# Patient Record
Sex: FEMALE | Race: WHITE
[De-identification: names, ages, dates, MRNs, and addresses within clinical notes are randomized per-mention and may not be internally consistent; named-entity substitution may affect disease eponyms.]

---

## 2017-04-17 ENCOUNTER — HOSPITAL ENCOUNTER (EMERGENCY)
Dept: HOSPITAL 74 - JER | Age: 22
Discharge: HOME | End: 2017-04-17
Payer: COMMERCIAL

## 2017-04-17 VITALS — BODY MASS INDEX: 41.5 KG/M2

## 2017-04-17 VITALS — TEMPERATURE: 98.4 F

## 2017-04-17 VITALS — DIASTOLIC BLOOD PRESSURE: 68 MMHG | SYSTOLIC BLOOD PRESSURE: 120 MMHG | HEART RATE: 80 BPM

## 2017-04-17 DIAGNOSIS — K52.9: Primary | ICD-10-CM

## 2017-04-17 LAB
ALBUMIN SERPL-MCNC: 3.9 G/DL (ref 3.4–5)
ALP SERPL-CCNC: 116 U/L (ref 45–117)
ALT SERPL-CCNC: 33 U/L (ref 12–78)
ANION GAP SERPL CALC-SCNC: 7 MMOL/L (ref 8–16)
ANISOCYTOSIS BLD QL SMEAR: (no result)
APPEARANCE UR: CLEAR
AST SERPL-CCNC: 22 U/L (ref 15–37)
BASOPHILS # BLD: 0.2 % (ref 0–2)
BILIRUB SERPL-MCNC: 0.8 MG/DL (ref 0.2–1)
BILIRUB UR STRIP.AUTO-MCNC: NEGATIVE MG/DL
CALCIUM SERPL-MCNC: 8.9 MG/DL (ref 8.5–10.1)
CO2 SERPL-SCNC: 28 MMOL/L (ref 21–32)
COLOR UR: YELLOW
CREAT SERPL-MCNC: 0.6 MG/DL (ref 0.55–1.02)
DEPRECATED RDW RBC AUTO: 20.1 % (ref 11.6–15.6)
EOSINOPHIL # BLD: 1.4 % (ref 0–4.5)
GLUCOSE SERPL-MCNC: 100 MG/DL (ref 74–106)
HYPOCHROMIA BLD QL SMEAR: (no result)
KETONES UR QL STRIP: (no result)
LEUKOCYTE ESTERASE UR QL STRIP.AUTO: NEGATIVE
MCH RBC QN AUTO: 22.8 PG (ref 25.7–33.7)
MCHC RBC AUTO-ENTMCNC: 32.1 G/DL (ref 32–36)
MCV RBC: 71 FL (ref 80–96)
MICROCYTES BLD QL SMEAR: (no result)
NEUTROPHILS # BLD: 79 % (ref 42.8–82.8)
NITRITE UR QL STRIP: NEGATIVE
PH UR: 8 [PH] (ref 5–8)
PLATELET # BLD AUTO: 255 K/MM3 (ref 134–434)
PMV BLD: 8.1 FL (ref 7.5–11.1)
PROT SERPL-MCNC: 7.3 G/DL (ref 6.4–8.2)
PROT UR QL STRIP: (no result)
PROT UR QL STRIP: NEGATIVE
RBC # UR STRIP: (no result) /UL
SP GR UR: 1.03 (ref 1–1.03)
UROBILINOGEN UR STRIP-MCNC: (no result) E.U./DL (ref 0.2–1)
WBC # BLD AUTO: 7.8 K/MM3 (ref 4–10)

## 2017-04-17 PROCEDURE — 3E033GC INTRODUCTION OF OTHER THERAPEUTIC SUBSTANCE INTO PERIPHERAL VEIN, PERCUTANEOUS APPROACH: ICD-10-PCS

## 2017-04-17 NOTE — PDOC
History of Present Illness





- General


Chief Complaint: Pain


Stated Complaint: VOMITING, DIARRHEA


Time Seen by Provider: 17 09:35


History Source: Patient


Exam Limitations: No Limitations





- History of Present Illness


Travel History: No


Initial Comments: 





17 09:57


Came to emergency department with  with complaints of acute onset of 

nausea and vomiting starting early this morning. States has had at least 10 

episodes of emesis, 2 episodes of diarrhea stool. No fevers but has felt chills

, states abdominal pain is diffuse and primarily epigastric. States over the 

weekend had excessive drinking, where admits to drinking 9 beers yesterday with 

whiskey and had equivalent or possibly more on Saturday.  states is not 

a usual pattern for patient, is not an excessive drinker but because of the 

holiday there were lots of parties they attended. No one else at home is sick, 

is 4-month-old at home no one else from barbecue or party yesterday has been 

ill with any gastroenteritis or food poisoning. It is had 2 diarrhea stools, 

but not foul-smelling, no evidence of blood bright red blood or black 

tarriness. No vaginal issues, no drainage, LMP has been regular. Postpartum 4 

months and currently has IUD


Timing/Duration: reports: intermittent


Quality: reports: cramping, sharpness


Abdominal Pain Onset Location: reports: epigastric, generalized abdomen


Pain Radiation: reports: no radiation


Alleviating Factors: improves with: None





Past History





- Travel


Traveled outside of the country in the last 30 days: No


Close contact w/someone who was outside of country & ill: No





- Past Medical History


Allergies/Adverse Reactions: 


 Allergies











Allergy/AdvReac Type Severity Reaction Status Date / Time


 


No Known Allergies Allergy   Verified 17 09:27











Home Medications: 


Ambulatory Orders





Albuterol Sulfate Inhaler - [Ventolin Hfa Inhaler -] 1 - 2 inh PO Q4H PRN  








Asthma: Yes (LAST ATTACK )


Cancer: No


Cardiac Disorders: No


Diabetes: No


HTN: No


Seizures: No


Thyroid Disease: No





- Family Disease History


Family Disease History: Diabetes: Father





- Reproductive History


 (#): 0


Para: 0


Therapeutic (s) & number: No





- Psycho/Social/Smoking Cessation Hx


Anxiety: No


Suicidal Ideation: No


Smoking History: Never smoked


Years of Tobacco Use: 7


Have you smoked in the past 12 months: No


Number of Cigarettes Smoked Daily: 2


Information on smoking cessation initiated: No


Hx Alcohol Use: No


Drug/Substance Use Hx: No


Substance Use Type: None


Hx Substance Use Treatment: No





Abd/GI Specific PMHX





- Complaint Specific PMHX


Colitis: No


Diverticulitis: No


Gall Bladder Disease: No


GERD: No


Hepatitis: No


Pancreatitis: No


GI Ulcer Disease: No





**Review of Systems





- Review of Systems


Able to Perform ROS?: Yes


Is the patient limited English proficient: Yes


Constitutional: Yes: Symptoms Reported, See HPI, Chills, Malaise.  No: Fever


HEENTM: Yes: See HPI.  No: Symptoms Reported, Nose Congestion, Throat Swelling, 

Difficulty Swallowing


Respiratory: Yes: Symptoms reported, See HPI.  No: Cough, Orthopnea, Shortness 

of Breath


Cardiac (ROS): No: Symptoms Reported


ABD/GI: Yes: Symptoms Reported


: Yes: See HPI.  No: Symptoms Reported, Burning, Dysuria, Discharge


Integumentary: Yes: See HPI, Pallor.  No: Symptoms Reported, Bruising, Erythema

, Flushing


Neurological: Yes: Symptoms reported


All Other Systems: Reviewed and Negative





*Physical Exam





- Vital Signs


 Last Vital Signs











Temp Pulse Resp BP Pulse Ox


 


 98.4 F   85   18   123/71   98 


 


 17 09:24  17 09:24  17 09:24  17 09:24  17 09:24














- Physical Exam


General Appearance: Yes: Nourished, Appropriately Dressed.  No: Apparent 

Distress, Alcohol on Breath


HEENT: positive: JUAN JOSE, Normal ENT Inspection, Normal Voice, TMs Normal, Pharynx 

Normal


Neck: positive: Tender, Supple.  negative: Lymphadenopathy (R), Lymphadenopathy 

(L)


Respiratory/Chest: positive: Lungs Clear, Normal Breath Sounds.  negative: 

Respiratory Distress, Decreased Breath Sounds, Wheezing


Cardiovascular: positive: Regular Rhythm


Gastrointestinal/Abdominal: positive: Normal Bowel Sounds, Soft (rebound 

tenderness or guarding,, morbidly obese).  negative: Tender


Musculoskeletal: positive: Normal Inspection.  negative: CVA Tenderness


Extremity: positive: Normal Capillary Refill, Normal Inspection, Normal Range 

of Motion


Integumentary: positive: Normal Color, Dry, Warm, Pale


Neurologic: positive: CNs II-XII NML intact, Fully Oriented, Alert, Normal Mood/

Affect, Normal Response, Motor Strength 5/5





ED Treatment Course





- LABORATORY


CBC & Chemistry Diagram: 


 17 10:09





 17 10:09





Progress Note





- Progress Note


Progress Note: 


Mild pain in acute onset of nausea and vomiting, will check basic labs, provide 

IV fluids, and reevaluate. May be alcohol related





Medical Decision Making





- Medical Decision Making





17 11:52


States feels much improved after IV Zofran, and liter of IV fluids. Laboratory 

work within normal limits, currently menstruating indicated by +3 blood in 

urine. Will discharge





*DC/Admit/Observation/Transfer


Diagnosis at time of Disposition: 


 Gastroenteritis





- Discharge Dispostion


Disposition: HOME


Condition at time of disposition: Stable


Admit: No





- Patient Instructions


Printed Discharge Instructions:  DI for Viral Gastroenteritis -- Adult


Additional Instructions: 


Rest, drink lots of fluids: Teas, water, soups


Ginger ale, carbonated beverages for the bubbles


May try peppermint teas


Avoid heavy , spicy or fatty foods until symptoms have resolved 





Avoid contact with others until fevers and symptoms resolved


Lots of handwashing and good hygiene





Continue over-the-counter medications for symptomatic relief


Tylenol or Motrin for fever and pain


Stop drinking alcohol





Followup with private physician in one to 2 days as needed


Return to emergency department for worsened symptoms, fevers, dehydration





- Post Discharge Activity


Work/School Note:  Back to Work

## 2017-04-17 NOTE — PDOC
*Physical Exam





- Vital Signs


 Last Vital Signs











Temp Pulse Resp BP Pulse Ox


 


 98.4 F   85   18   123/71   98 


 


 04/17/17 09:24  04/17/17 09:24  04/17/17 09:24  04/17/17 09:24  04/17/17 09:24














ED Treatment Course





- LABORATORY


CBC & Chemistry Diagram: 


 04/17/17 10:09





 04/17/17 10:09





- Medications


Given in the ED: 


ED Medications














Discontinued Medications














Generic Name Dose Route Start Last Admin





  Trade Name Silas  PRN Reason Stop Dose Admin


 


Ondansetron HCl  4 mg 04/17/17 10:13 04/17/17 10:24





  Zofran Injection  IVPUSH 04/17/17 10:14  4 mg





  ONCE ONE   Administration














Medical Decision Making





- Medical Decision Making





04/17/17 10:42





Pt seen by the Advanced Practice Provider under my direct supervision


Ancillary studies reviewed





I agree with plan as outlined by the Advanced Practice Provider VALERIANO Warren





*DC/Admit/Observation/Transfer


Diagnosis at time of Disposition: 


 Gastroenteritis





- Discharge Dispostion


Disposition: HOME


Condition at time of disposition: Stable





- Referrals


Referrals: 


Michelle Valerio [Primary Care Provider] - 





- Patient Instructions


Printed Discharge Instructions:  DI for Viral Gastroenteritis -- Adult


Additional Instructions: 


Rest, drink lots of fluids: Teas, water, soups


Ginger ale, carbonated beverages for the bubbles


May try peppermint teas


Avoid heavy , spicy or fatty foods until symptoms have resolved 





Avoid contact with others until fevers and symptoms resolved


Lots of handwashing and good hygiene





Continue over-the-counter medications for symptomatic relief


Tylenol or Motrin for fever and pain


Stop drinking alcohol





Followup with private physician in one to 2 days as needed


Return to emergency department for worsened symptoms, fevers, dehydration





- Post Discharge Activity


Work/School Note:  Back to Work

## 2019-08-25 ENCOUNTER — HOSPITAL ENCOUNTER (EMERGENCY)
Dept: HOSPITAL 74 - JER | Age: 24
Discharge: HOME | End: 2019-08-25
Payer: COMMERCIAL

## 2019-08-25 VITALS — BODY MASS INDEX: 32.4 KG/M2

## 2019-08-25 VITALS — TEMPERATURE: 97.8 F | DIASTOLIC BLOOD PRESSURE: 61 MMHG | HEART RATE: 78 BPM | SYSTOLIC BLOOD PRESSURE: 110 MMHG

## 2019-08-25 DIAGNOSIS — O21.0: ICD-10-CM

## 2019-08-25 DIAGNOSIS — Z3A.01: ICD-10-CM

## 2019-08-25 DIAGNOSIS — N83.202: ICD-10-CM

## 2019-08-25 DIAGNOSIS — O26.891: Primary | ICD-10-CM

## 2019-08-25 DIAGNOSIS — O34.81: ICD-10-CM

## 2019-08-25 LAB
ALBUMIN SERPL-MCNC: 3.7 G/DL (ref 3.4–5)
ALP SERPL-CCNC: 91 U/L (ref 45–117)
ALT SERPL-CCNC: 20 U/L (ref 13–61)
ANION GAP SERPL CALC-SCNC: 5 MMOL/L (ref 8–16)
APPEARANCE UR: CLEAR
AST SERPL-CCNC: 17 U/L (ref 15–37)
BASOPHILS # BLD: 0.5 % (ref 0–2)
BILIRUB SERPL-MCNC: 0.3 MG/DL (ref 0.2–1)
BILIRUB UR STRIP.AUTO-MCNC: NEGATIVE MG/DL
BUN SERPL-MCNC: 10.7 MG/DL (ref 7–18)
CALCIUM SERPL-MCNC: 8.9 MG/DL (ref 8.5–10.1)
CHLORIDE SERPL-SCNC: 105 MMOL/L (ref 98–107)
CO2 SERPL-SCNC: 27 MMOL/L (ref 21–32)
COLOR UR: YELLOW
CREAT SERPL-MCNC: 0.6 MG/DL (ref 0.55–1.3)
DEPRECATED RDW RBC AUTO: 19.6 % (ref 11.6–15.6)
EOSINOPHIL # BLD: 1.1 % (ref 0–4.5)
GLUCOSE SERPL-MCNC: 99 MG/DL (ref 74–106)
HCT VFR BLD CALC: 33.3 % (ref 32.4–45.2)
HGB BLD-MCNC: 11 GM/DL (ref 10.7–15.3)
KETONES UR QL STRIP: NEGATIVE
LEUKOCYTE ESTERASE UR QL STRIP.AUTO: NEGATIVE
LIPASE SERPL-CCNC: 70 U/L (ref 73–393)
LYMPHOCYTES # BLD: 22.5 % (ref 8–40)
MCH RBC QN AUTO: 23.7 PG (ref 25.7–33.7)
MCHC RBC AUTO-ENTMCNC: 32.9 G/DL (ref 32–36)
MCV RBC: 72 FL (ref 80–96)
MONOCYTES # BLD AUTO: 7 % (ref 3.8–10.2)
NEUTROPHILS # BLD: 68.9 % (ref 42.8–82.8)
NITRITE UR QL STRIP: NEGATIVE
PH UR: 7.5 [PH] (ref 5–8)
PLATELET # BLD AUTO: 303 K/MM3 (ref 134–434)
PMV BLD: 7.7 FL (ref 7.5–11.1)
POTASSIUM SERPLBLD-SCNC: 4.2 MMOL/L (ref 3.5–5.1)
PROT SERPL-MCNC: 6.9 G/DL (ref 6.4–8.2)
PROT UR QL STRIP: NEGATIVE
PROT UR QL STRIP: NEGATIVE
RBC # BLD AUTO: 4.63 M/MM3 (ref 3.6–5.2)
SODIUM SERPL-SCNC: 137 MMOL/L (ref 136–145)
SP GR UR: 1.01 (ref 1.01–1.03)
UROBILINOGEN UR STRIP-MCNC: 0.2 MG/DL (ref 0.2–1)
WBC # BLD AUTO: 8.1 K/MM3 (ref 4–10)

## 2019-08-25 PROCEDURE — 3E033GC INTRODUCTION OF OTHER THERAPEUTIC SUBSTANCE INTO PERIPHERAL VEIN, PERCUTANEOUS APPROACH: ICD-10-PCS | Performed by: EMERGENCY MEDICINE

## 2019-08-25 NOTE — PDOC
History of Present Illness





- General


Chief Complaint: Nausea/Vomiting


Stated Complaint: VOMITING/ ABD PAIN


Time Seen by Provider: 19 10:15


History Source: Patient


Exam Limitations: Language Barrier (H[place]er)





- History of Present Illness


Initial Comments: 





19 10:44


Jessica Mcbride is a 23yF w PMHx asthma, obesity presenting w 

epigastric pain and vomiting. Woke up at 4a this morning w sudden onset 

epgiastric pain. Associated nausea and vomiting 7x, non bloody. Unable to keep 

fluids down. Took zantac without relief. At [employer] restaurant last 

night w family, no one else sick. Last bowel movement 1 day ago, normal.l No 

abdominal surgeries. Denies fever, SOB, chest pain, urinary/bowel mvmt changes. 








Past History





- Past Medical History


Allergies/Adverse Reactions: 


 Allergies











Allergy/AdvReac Type Severity Reaction Status Date / Time


 


No Known Allergies Allergy   Verified 19 10:05











Home Medications: 


Ambulatory Orders





Albuterol Sulfate Inhaler - [Ventolin Hfa Inhaler -] 1 - 2 inh PO Q4H PRN  








Asthma: Yes (LAST ATTACK )


Cancer: No


Cardiac Disorders: No


COPD: No


CHF: No


Diabetes: No


HTN: No


Hypercholesterolemia: No


Kidney Stones: No


Seizures: No


Thyroid Disease: No





- Surgical History


Abdominal Surgery: No


Appendectomy: No


Cardiac Surgery: No


Cholecystectomy: No


Gastric Stapling: No


GI Surgery: No


Lung Surgery: No


Neurologic Surgery: No


Orthopedic Surgery: No





- Family Disease History


Family Disease History: Diabetes: Father





- Reproductive History


 (#): 0


Para: 0


Therapeutic (s) & number: No





- Suicide/Smoking/Psychosocial Hx


Smoking History: Never smoked


Years of Tobacco Use: 7


Have you smoked in the past 12 months: No


Number of Cigarettes Smoked Daily: 2


Hx Alcohol Use: No


Drug/Substance Use Hx: No


Substance Use Type: None


Hx Substance Use Treatment: No





**Review of Systems





- Review of Systems


Constitutional: No: Chills, Fever


HEENTM: No: Eye Pain, Ear Pain, Nose Pain, Nose Congestion, Throat Pain, Mouth 

Pain


Respiratory: No: Cough, Shortness of Breath


Cardiac (ROS): No: Chest Pain, Palpitations, Syncope


ABD/GI: Yes: Nausea, Poor Appetite, Poor Fluid Intake, Vomiting.  No: Abdominal 

Distended, Constipated, Diarrhea


: No: Burning, Dysuria, Discharge, Flank Pain, Hematuria, Incontinence


Musculoskeletal: No: Back Pain, Joint Pain, Muscle Pain, Muscle Weakness


Integumentary: No: Bruising, Flushing, Lesions


Neurological: No: Headache, Paresthesia, Seizure, Tingling, Tremors


Psychiatric: No: Anxiety, Depression


Endocrine: No: Excessive Sweating, Flushing, Intolerance to Cold, Intolerance 

to Heat


Hematologic/Lymphatic: No: Anemia, Blood Clots, Easy Bleeding





*Physical Exam





- Vital Signs


 Last Vital Signs











Temp Pulse Resp BP Pulse Ox


 


 98.6 F   80   16   107/62   100 


 


 19 10:03  19 10:03  19 10:03  19 10:03  19 10:03














- Physical Exam


General Appearance: Yes: Nourished, Appropriately Dressed, Mild Distress, 

Obese.  No: Alcohol on Breath, Intoxicated


HEENT: positive: EOMI, JUAN JOSE, Normal Voice, Hearing Grossly Normal.  negative: 

Pale Conjunctivae, Scleral Icterus (R), Scleral Icterus (L), Nasal Congestion, 

Rhinorrhea


Respiratory/Chest: positive: Lungs Clear, Normal Breath Sounds.  negative: 

Chest Tender, Respiratory Distress, Crackles, Rales, Rhonchi, Stridor, Wheezing


Cardiovascular: positive: Regular Rhythm, Regular Rate, S1, S2.  negative: Edema

, Murmur


Gastrointestinal/Abdominal: positive: Normal Bowel Sounds, Tender (moderate 

tenderness to palpation epigastric region), Flat, Soft, Guarding (mild guarding 

epigastric region).  negative: Organomegaly, Pulsatile Mass, Distended, Rebound

, Hernia


Extremity: positive: Delayed Capillary Refill


Integumentary: positive: Normal Color, Dry


Neurologic: positive: Fully Oriented, Alert, Normal Mood/Affect, Normal Response

, Responsive.  negative: Sensory Deficit, Confused, Disoriented





ED Treatment Course





- LABORATORY


CBC & Chemistry Diagram: 


 19 11:14





 19 11:14





Medical Decision Making





- Medical Decision Making





19 10:42


CXR EKG CBC CMP lipase UAHCG


zofran for nausea, 1L NS, pepcid, maalox for GI upset


EKG shows NSR, trop neg


CBC, CMP normal


+ HCG - pregnant, ordered TVUS, HCG quant


RUQ US did not show signs of cholangitis


TVUS showed IUP estimated gestational age 6 weeks w fetal heart activity. L 

ovarian cyst 6.2 x 5.8cm likely hemorrhagic corpus luteal cyst


CXR clear, UA normal





Jessica Mcbride is a 23yF w PMHx asthma, obesity presenting w 

epigastric pain and vomiting. Symptoms due to pregnancy w +HCG. TVUS showed IUP 

estimated gestational age 6 weeks w fetal heart activity. L ovarian cyst 6.2 x 

5.8cm likely hemorrhagic corpus luteal cyst. Unlikely ovarian torsion - no LLQ 

tenderness, absent 10/10 lower AB pain. RUQ US showed no signs of cholangitis. 

Not ACS in setting of NSR EKG, neg trop, no UTI. CXR clear lungs. Given zofran 

for nausea, 1L NS, pepcid, maalox for GI upset





D/c home w follow up w obgyn for pregnancy and L ovarian cyst.





*DC/Admit/Observation/Transfer


Diagnosis at time of Disposition: 


 Left ovarian cyst, Hyperemesis gravidarum





Normal IUP (intrauterine pregnancy) on prenatal ultrasound


Qualifiers:


 Trimester: first trimester Qualified Code(s): Z34.91 - Encounter for 

supervision of normal pregnancy, unspecified, first trimester








- Discharge Dispostion


Disposition: HOME


Condition at time of disposition: Improved


Decision to Admit order: No





- Referrals


Referrals: 


Michelle Valerio [Primary Care Provider] - 





- Patient Instructions


Printed Discharge Instructions:  DI for Pregnancy -- Discomforts and Remedies


Additional Instructions: 


You were seen for abdominal pain and vomiting. Your labs and ultrasound shows 

that you are pregnant and have a left ovarian cyst. You were given medication 

to relieve your pain and vomiting.





Please make an appointment with your obgyn Dr Morales regarding your 

pregnancy and ovarian cyst. Start taking prenatal vitamins.





Come back to the ED if you continue vomiting, have lower abdominal pain


Print Language: Hungarian





- Post Discharge Activity

## 2019-08-25 NOTE — PDOC
Documentation entered by Kory Braxton SCRIBE, acting as scribe for Marino Isaac MD.








Marino Isaac MD:  This documentation has been prepared by the [employer], SCRIBE, under my direction and personally reviewed by me in its 

entirety.  I confirm that the documentation accurately reflects all work, 

treatment, procedures, and medical decision making performed by me.  





Attending Attestation





- Resident


Resident Name: Christain Bertrand





- ED Attending Attestation


I have performed the following: I have examined & evaluated the patient, The 

case was reviewed & discussed with the resident, I agree w/resident's findings 

& plan, Exceptions are as noted





- HPI


HPI: 





08/25/19 11:27


The patient is a 23 year old female, with a significant past medical history of 

asthma, who presents to the emergency department with, 1 day of sudden onset 

epigastric abdominal pain, nausea with approximately 7 episodes of emesis. 

Patient took Zantac, without relief.





She denies anyone in the family being sick with similar symptoms. She denies 

recent fevers, chills, diarrhea, headache, or dizziness. She denies recent  

dysuria, frequency, urgency, or hematuria. She denies recent chest pain or 

shortness of breath.





Allergies: NKDA 


Primary Care Physician: Dr. Valerio





- Physicial Exam


PE: 





08/25/19 11:39


"GENERAL: Awake, alert, and fully oriented, in no acute distress.


HEAD: No signs of trauma


EYES: PERRLA, EOMI, sclera anicteric, conjunctiva clear


ENT: Auricles normal inspection, hearing grossly normal, nares patent, 

oropharynx clear without exudates. Moist mucosa


NECK: Nontender, no stepoffs, Normal ROM, supple, no lymphadenopathy, JVD, or 

masses


LUNGS: Breath sounds equal, clear to auscultation bilaterally.  No wheezes, and 

no crackles


HEART: Regular rate and rhythm, normal S1 and S2, no murmurs, rubs or gallops


ABDOMEN: + RUQ TTP, normoactive bowel sounds.  No guarding, no rebound.  No 

masses


EXTREMITIES: Normal range of motion, no edema.  No clubbing or cyanosis. No 

cords, erythema, or tenderness


NEUROLOGICAL: Cranial nerves II through XII intact. 5/5 strength and sensation 

in all extremities, Normal speech, normal gait, normal cerebellar function


SKIN: Warm, Dry, normal turgor, no rashes or lesions noted.





- Medical Decision Making





08/25/19 11:39


23 F with epigastric and RUQ pain, vomiting. Will evaluate for michell/

pancreatitis. No lower abdominal pain to suggest appy/colitis/diverticulitis or 

pelvic pathology.


- Labs, lipase


- RUQ sono


- GI cocktail





08/25/19 11:42


Pt with + pregnancy test


Will add OB US





08/25/19 13:21


US shows viable 6wk pregnancy as well as large L ovarian cyst.


Pt without LLQ pain or other s/s of ovarian torsion


Pt reports that she had an IUD in place, but it is not visualized on the US. 

Likely fell out.





RUQ sono negative





Pt reassessed - tolerating PO


Pt is well appearing, with normal vitals. Clinically stable for DC at this time.


I discussed the physical exam findings, ancillary test results and final 

diagnoses with the patient. I answered all of the patient's questions. The 

patient was satisfied with the care received and felt comfortable with the 

discharge plan and treatment plan.  The patient agrees to follow up with the 

primary care physician within 24-72 hours.





08/25/19 13:31

## 2019-08-26 NOTE — EKG
Test Reason : 

Blood Pressure : ***/*** mmHG

Vent. Rate : 077 BPM     Atrial Rate : 077 BPM

   P-R Int : 158 ms          QRS Dur : 086 ms

    QT Int : 380 ms       P-R-T Axes : 048 -16 008 degrees

   QTc Int : 430 ms

 

NORMAL SINUS RHYTHM

MINIMAL VOLTAGE CRITERIA FOR LVH, MAY BE NORMAL VARIANT

BORDERLINE ECG

NO PREVIOUS ECGS AVAILABLE

Confirmed by ESTEFANIA BURGESS, CYNTHIA ([id number]) on 8/26/2019 8:16:35 AM

 

Referred By:             Confirmed By:CYNTHIA MAC MD

## 2019-09-27 ENCOUNTER — HOSPITAL ENCOUNTER (EMERGENCY)
Dept: HOSPITAL 74 - JER | Age: 24
Discharge: HOME | End: 2019-09-27
Payer: COMMERCIAL

## 2019-09-27 VITALS — TEMPERATURE: 97.9 F | DIASTOLIC BLOOD PRESSURE: 64 MMHG | SYSTOLIC BLOOD PRESSURE: 108 MMHG | HEART RATE: 80 BPM

## 2019-09-27 VITALS — BODY MASS INDEX: 32.3 KG/M2

## 2019-09-27 DIAGNOSIS — O21.0: ICD-10-CM

## 2019-09-27 DIAGNOSIS — Z87.09: ICD-10-CM

## 2019-09-27 DIAGNOSIS — O26.891: Primary | ICD-10-CM

## 2019-09-27 DIAGNOSIS — Z3A.10: ICD-10-CM

## 2019-09-27 LAB
ALBUMIN SERPL-MCNC: 3.4 G/DL (ref 3.4–5)
ALP SERPL-CCNC: 75 U/L (ref 45–117)
ALT SERPL-CCNC: 14 U/L (ref 13–61)
ANION GAP SERPL CALC-SCNC: 7 MMOL/L (ref 8–16)
AST SERPL-CCNC: 12 U/L (ref 15–37)
BASOPHILS # BLD: 0.6 % (ref 0–2)
BILIRUB SERPL-MCNC: 0.3 MG/DL (ref 0.2–1)
BUN SERPL-MCNC: 7.7 MG/DL (ref 7–18)
CALCIUM SERPL-MCNC: 9.3 MG/DL (ref 8.5–10.1)
CHLORIDE SERPL-SCNC: 104 MMOL/L (ref 98–107)
CO2 SERPL-SCNC: 26 MMOL/L (ref 21–32)
CREAT SERPL-MCNC: 0.6 MG/DL (ref 0.55–1.3)
DEPRECATED RDW RBC AUTO: 18.3 % (ref 11.6–15.6)
EOSINOPHIL # BLD: 1.2 % (ref 0–4.5)
GLUCOSE SERPL-MCNC: 99 MG/DL (ref 74–106)
HCT VFR BLD CALC: 34.4 % (ref 32.4–45.2)
HGB BLD-MCNC: 11.1 GM/DL (ref 10.7–15.3)
LIPASE SERPL-CCNC: 83 U/L (ref 73–393)
LYMPHOCYTES # BLD: 23.3 % (ref 8–40)
MCH RBC QN AUTO: 24 PG (ref 25.7–33.7)
MCHC RBC AUTO-ENTMCNC: 32.3 G/DL (ref 32–36)
MCV RBC: 74.5 FL (ref 80–96)
MONOCYTES # BLD AUTO: 8.2 % (ref 3.8–10.2)
NEUTROPHILS # BLD: 66.7 % (ref 42.8–82.8)
PLATELET # BLD AUTO: 319 K/MM3 (ref 134–434)
PMV BLD: 8.1 FL (ref 7.5–11.1)
POTASSIUM SERPLBLD-SCNC: 3.8 MMOL/L (ref 3.5–5.1)
PROT SERPL-MCNC: 6.7 G/DL (ref 6.4–8.2)
RBC # BLD AUTO: 4.61 M/MM3 (ref 3.6–5.2)
SODIUM SERPL-SCNC: 137 MMOL/L (ref 136–145)
WBC # BLD AUTO: 8.5 K/MM3 (ref 4–10)

## 2019-09-27 PROCEDURE — 3E033GC INTRODUCTION OF OTHER THERAPEUTIC SUBSTANCE INTO PERIPHERAL VEIN, PERCUTANEOUS APPROACH: ICD-10-PCS | Performed by: EMERGENCY MEDICINE

## 2019-09-27 NOTE — PDOC
Attending Attestation





- Resident


Resident Name: Colleen Mcdonald





- ED Attending Attestation


I have performed the following: I have examined & evaluated the patient, The 

case was reviewed & discussed with the resident, I agree w/resident's findings 

& plan, Exceptions are as noted





- HPI


HPI: 





23 years old  10 weeks pregnant presents with nausea vomiting 2 days 

patient has had difficulty tolerating by mouth during this time has appointment 

with her OB/GYN at noon





Symptoms are moderate persistent concent no exacerbating or alleviating factors





ROS:  A complete review of 10 out of 10 review of systems is taken and is 

negative apart from what is previously mentioned below and in the HPI.








- Physicial Exam


PE: 





19 12:08





Vitals: Triage Vital signs reviewed  


General Appearance:  no acute distress, well nourished well developed, 


Head: Atraumatic, 


Neck:  Supple;No Nucal rigidity


Chest Wall: Nontender


Cardiac: Regular rate and rhythym, no murmurs, no rubs, no gallops, 


Lungs: Clear to auscultation bilateral, good air movement bilaterally,


Abdomen:  Soft, non distended, normal bowel sounds, non tender to palpation


Extremities: Full range of motion to all extremities, no cyanosis, clubbing, or 

edema


Skin:  Warm and dry, no rashes or lesions, no rash, no petechiae


Psych:  normal mood, normal affect








- Medical Decision Making





19 18:33





Laboratory analysis within normal limits patient feels much better now 

tolerating fluids by mouth. She is requesting to leave so she can make her 

discharge appointment at 12 with her OB/GYN





Findings, the need for follow-up and strict return instructions discussed with 

patient.

## 2019-09-27 NOTE — PDOC
History of Present Illness





<Joon Vaughan - Last Filed: 19 11:45>





- General


History Source: Patient


Exam Limitations: No Limitations





- History of Present Illness


Initial Comments: 


Pt is a 22 yo F,  @10 weeks, who is presenting from home with complaints of 

persistent nausea/vomiting x2 days. Pt states she has not been able to tolerate 

PO intake during this time, and was unable to urinate today. Pt denies any 

abdominal pain or vaginal bleeding, and has an appointment for OB US at 1pm 

today. Pt denies any fevers/chills, headache, vision changes, syncope, chest 

pain, palpitations, SOB, abdominal pain, urinary symptoms, diarrhea/constipation

, or leg swelling.





Allergies: NKDA


PCP: Dr. Valerio


OB: Dr. Anamaria Morales





Social: Pt denies any cigarette, alcohol, or drug use.


Pt denies any recent travel or sick contacts.


Surgical: no relevant history.


Family: no relevant history.


19 09:47








<Colleen Mcdonald - Last Filed: 19 11:53>





- General


Chief Complaint: Pregnancy,Possible


Stated Complaint: NAUSEA/10 WEEKS PREGNANT


Time Seen by Provider: 19 09:07





Past History





<Joon Vaughan - Last Filed: 19 11:45>





- Travel


Traveled outside of the country in the last 30 days: No


Close contact w/someone who was outside of country & ill: No





- Past Medical History


Asthma: Yes (LAST ATTACK )


Cancer: No


Cardiac Disorders: No


COPD: No


CHF: No


Diabetes: No


HTN: No


Hypercholesterolemia: No


Kidney Stones: No


Seizures: No


Thyroid Disease: No





- Surgical History


Abdominal Surgery: No


Appendectomy: No


Cardiac Surgery: No


Cholecystectomy: No


Gastric Stapling: No


GI Surgery: No


Lung Surgery: No


Neurologic Surgery: No


Orthopedic Surgery: No





- Reproductive History


 (#): 0


Para: 0


Therapeutic (s) & number: No





- Psycho Social/Smoking Cessation Hx


Smoking History: Never smoked


Years of Tobacco Use: 7


Have you smoked in the past 12 months: No


Number of Cigarettes Smoked Daily: 2


Information on smoking cessation initiated: No


Hx Alcohol Use: No


Drug/Substance Use Hx: No


Substance Use Type: None


Hx Substance Use Treatment: No





<Colleen Mcdonald - Last Filed: 19 11:53>





- Past Medical History


Allergies/Adverse Reactions: 


 Allergies











Allergy/AdvReac Type Severity Reaction Status Date / Time


 


No Known Allergies Allergy   Verified 19 10:05











Home Medications: 


Ambulatory Orders





Albuterol Sulfate Inhaler - [Ventolin Hfa Inhaler -] 1 - 2 inh PO Q4H PRN  


Doxylamine Succinate/Vit B6 [Diclegis Dr 10-10 mg Tablet] 1 each PO ASDIR #30 

tablet. 19 











Abd/GI Specific PMHX





- Complaint Specific PMHX


Colitis: No


Diverticulitis: No


Gall Bladder Disease: No


GERD: No


Hepatitis: No


Irritable Bowel Synd (IBS): No


Pancreatitis: No


GI Ulcer Disease: No





<Colleen Mcdonald - Last Filed: 19 11:53>





**Review of Systems





- Review of Systems


Able to Perform ROS?: Yes


Is the patient limited English proficient: No


Constitutional: Yes: Weight Stable.  No: Chills, Diaphoresis, Fever, Loss of 

Appetite, Malaise, Weakness


HEENTM: No: Recent change in vision, Nose Congestion, Throat Pain, Throat 

Swelling, Difficulty Swallowing


Respiratory: No: Cough, Orthopnea, Shortness of Breath


Cardiac (ROS): No: Chest Pain, Edema, Irregular Heart Rate, Lightheadedness, 

Palpitations, Syncope, Chest Tightness


ABD/GI: Yes: Nausea, Poor Appetite, Poor Fluid Intake, Vomiting.  No: 

Constipated, Diarrhea, Indigestion, Abdominal cramping


: No: Burning, Dysuria, Frequency, Flank Pain, Hematuria, Pain, Urgency


Musculoskeletal: No: Muscle Pain, Muscle Weakness


Integumentary: No: Rash


Neurological: No: Headache, Numbness, Weakness, Unsteady Gait, Dizziness


Psychiatric: No: Sleep Pattern Change, Change in Appetite


Endocrine: No: Increased Urine, Change in Weight


Hematologic/Lymphatic: No: Anemia, Blood Clots, Easy Bleeding, Easy Bruising


All Other Systems: Reviewed and Negative





<Colleen Mcdonald - Last Filed: 19 11:53>





*Physical Exam





- Vital Signs


 Last Vital Signs











Temp Pulse Resp BP Pulse Ox


 


 97.9 F   80   18   108/64   98 


 


 19 08:58  19 08:58  19 08:58  19 08:58  19 08:58














<Joon Vaughan - Last Filed: 19 11:45>





- Vital Signs


 Last Vital Signs











Temp Pulse Resp BP Pulse Ox


 


 97.9 F   80   18   108/64   98 


 


 19 08:58  19 08:58  19 08:58  19 08:58  19 08:58














- Physical Exam


Comments: 


Vitals stable, pt afebrile. Pt actively vomiting clear fluid in the room, 

overweight body habitus. 


Pt alert and oriented x3.


CNs generally intact, muscular strength and sensation intact. 


No midline spinal tenderness, step-offs, or crepitus. 


Head normocephalic, atraumatic.


Eyes PERRLA, EOMI. Oropharynx without erythema or exudates, no LAD b/l. 


No nasal congestion, hearing intact. 


Clear heart sounds, S1/S2, no JVD, b/l pedal edema, or heart murmur. 


Clear lung sounds, no respiratory distress, wheezes, crackles, or accessory 

muscle use. 


No abdominal or CVA tenderness to palpation, no rebound, no guarding. Abdomen 

soft, non-distended, and with normoactive bowel sounds. Fundal height 

appropriate for gestational age. 


Skin without jaundice or rash. 


19 09:49








<Colleen Mcdonald - Last Filed: 19 11:53>





ED Treatment Course





- LABORATORY


CBC & Chemistry Diagram: 


 19 09:29





 19 09:29





- ADDITIONAL ORDERS


Additional order review: 


 Laboratory  Results











  19





  09:29


 


Sodium  137


 


Potassium  3.8


 


Chloride  104


 


Carbon Dioxide  26


 


Anion Gap  7 L


 


BUN  7.7


 


Creatinine  0.6


 


Est GFR (CKD-EPI)AfAm  147.86


 


Est GFR (CKD-EPI)NonAf  127.58


 


Random Glucose  99


 


Calcium  9.3


 


Total Bilirubin  0.3


 


AST  12 L


 


ALT  14


 


Alkaline Phosphatase  75


 


Total Protein  6.7


 


Albumin  3.4


 


Lipase  83


 


Beta HCG, Quant  12879.6








 











  19





  09:29


 


RBC  4.61


 


MCV  74.5 L


 


MCHC  32.3


 


RDW  18.3 H


 


MPV  8.1


 


Neutrophils %  66.7


 


Lymphocytes %  23.3


 


Monocytes %  8.2


 


Eosinophils %  1.2


 


Basophils %  0.6














- Medications


Given in the ED: 


ED Medications














Discontinued Medications














Generic Name Dose Route Start Last Admin





  Trade Name Silas  PRN Reason Stop Dose Admin


 


Famotidine/Sodium Chloride  20 mg in 50 mls @ 100 mls/hr  19 09:20   09:52





  Pepcid 20 Mg Premixed Ivpb -  IVPB  19 09:49  100 mls/hr





  ONCE ONE   Administration





     





     





     





     


 


Sodium Chloride  1,000 mls @ 1,000 mls/hr  19 09:20  19 09:52





  Normal Saline -  IV  19 10:19  1,000 mls/hr





  ASDIR STA   Administration





     





     





     





     


 


Ondansetron HCl  4 mg  19 09:08  19 09:52





  Zofran Odt -  SL  19 09:09  Not Given





  ONCE ONE   





     





     





     





     


 


Ondansetron HCl  4 mg  19 09:20  19 09:52





  Zofran Injection  IVPUSH  19 09:21  4 mg





  ONCE ONE   Administration





     





     





     





     


 


Sodium Chloride  1,000 ml  19 10:28  19 10:39





  Normal Saline -  IV  19 10:29  1,000 ml





  ONCE ONE   Administration





     





     





     





     














<Joon Vaughan - Last Filed: 19 11:45>





- LABORATORY


CBC & Chemistry Diagram: 


 19 09:29





 19 09:29





<Colleen Mcdonald - Last Filed: 19 11:53>





Medical Decision Making





- Medical Decision Making


Pt was seen at bedside, also will be seen by attending Dr. Vaughan. Pt presenting 

with complaints of persistent nausea/vomiting @10 weeks. No abdominal pain or 

vaginal bleeding. Will evaluate electrolytes, LFTs, and urine for infection. Pt 

had TVUS done at 6 weeks which showed IUP. PT has f/u OB US appointment today 

at 1 pm. 





Provided 1 L IV NS, 4 mg IV zofran, and 20 mg IV pepcid for improvement of 

nausea and vomiting, hydration.


Will continue to reassess pt and monitor for symptomatic improvement.


19 09:50





CMP WNL. Pt still unable to urinate. Providing additional 1 L IV NS (2 L total)

. 


19 10:46





Pt symptoms much improved. Ambulatory in ED and tolerated PO intake. Pt going 

to OB appointment at 1 pm.


Sent Diclegis to pt pharmacy. Strict return precautions provided with pt 

understanding. 


19 11:52








<TamaraColleen - Last Filed: 19 11:53>





Discharge





- Discharge Information


Problems reviewed: Yes





- Admission


No





<Joon Vaughan - Last Filed: 19 11:45>





- Discharge Information


Problems reviewed: Yes





- Admission


No





<TamaraColleen - Last Filed: 19 11:53>





- Discharge Information


Clinical Impression/Diagnosis: 


 Intrauterine pregnancy





Nausea and vomiting


Qualifiers:


 Vomiting type: unspecified Vomiting Intractability: non-intractable Qualified 

Code(s): R11.2 - Nausea with vomiting, unspecified





Condition: Improved


Disposition: HOME





- Additional Discharge Information


Prescriptions: 


Doxylamine Succinate/Vit B6 [Diclegis Dr 10-10 mg Tablet] 1 each PO ASDIR #30 

tablet.dr





- Follow up/Referral


Referrals: 


Michelle Valerio [Primary Care Provider] - 





- Patient Discharge Instructions


Patient Printed Discharge Instructions:  DI for Hyperemesis Gravidarum


Additional Instructions: 


Take two DICLEGIS delayed-release tablets orally at bedtime (Day 1). If this 

dose adequately controls symptoms the next day, continue taking two tablets 

daily at bedtime. However, if symptoms persist into the afternoon of Day 2, 

take the usual dose of two tablets at bedtime that night then take three 

tablets starting on Day 3 (one tablet in the morning and two tablets at bedtime)

. If these three tablets adequately control symptoms on Day 4, continue taking 

three tablets daily. Otherwise take four tablets starting on Day 4 (one tablet 

in the morning, one tablet mid-afternoon and two tablets at bedtime).





Follow-up with your OB/GYN today. Return to ED for any severe worsening 

symptoms or for any concerns.





[place] dos tabletas de liberacin retardada de DICLEGIS por va oral al acostarse 

(da 1). Si esta dosis controla adecuadamente los sntomas al da siguiente, 

contine tomando dos tabletas al da antes de acostarse. Sin embargo, si los 

sntomas persisten hasta la tarde del da 2, tome la dosis habitual de dos 

tabletas al acostarse praveen noche y luego tome josé miguel tabletas a partir del da 3 (

mayito tableta en la maana y dos tabletas a la hora de acostarse). Si estas josé miguel 

tabletas controlan adecuadamente los sntomas el da 4, contine tomando josé miguel 

tabletas al da. De lo contrario, tome cuatro tabletas a partir del da 4 (mayito 

tableta en la maana, mayito tableta a media tarde y dos tabletas a la hora de 

acostarse).





Yair un seguimiento con conner obstetra / gineclogo hoy. Regrese a la [place] por 

cualquier sntoma de empeoramiento grave o por cualquier inquietud.